# Patient Record
Sex: FEMALE | Race: WHITE | NOT HISPANIC OR LATINO | URBAN - NONMETROPOLITAN AREA
[De-identification: names, ages, dates, MRNs, and addresses within clinical notes are randomized per-mention and may not be internally consistent; named-entity substitution may affect disease eponyms.]

---

## 2018-06-22 PROBLEM — D31.32: Noted: 2018-06-22

## 2018-06-22 PROBLEM — H52.13: Noted: 2018-06-22

## 2018-06-22 PROBLEM — H52.4: Noted: 2018-06-22

## 2019-06-21 ENCOUNTER — IMPORTED ENCOUNTER (OUTPATIENT)
Dept: URBAN - NONMETROPOLITAN AREA CLINIC 1 | Facility: CLINIC | Age: 59
End: 2019-06-21

## 2019-06-21 PROCEDURE — 92014 COMPRE OPH EXAM EST PT 1/>: CPT

## 2019-06-21 PROCEDURE — 92015 DETERMINE REFRACTIVE STATE: CPT

## 2019-06-21 NOTE — PATIENT DISCUSSION
*Choroidal nevus:. OS-  Discussed findings of exam in detail with the patient. -  discussed the risk of choroidal melanoma and the importance of monitoring for this disease. oct today*Myopia:. -  Discussed refractive status with patient in detail. *Presbyopia:. -  Discussed presbyopia with patient in detail. *Gls RX:. -  A new spectacle prescription was created and dispensed today.

## 2020-08-26 ENCOUNTER — IMPORTED ENCOUNTER (OUTPATIENT)
Dept: URBAN - NONMETROPOLITAN AREA CLINIC 1 | Facility: CLINIC | Age: 60
End: 2020-08-26

## 2020-08-26 PROCEDURE — 92014 COMPRE OPH EXAM EST PT 1/>: CPT

## 2020-08-26 PROCEDURE — 92015 DETERMINE REFRACTIVE STATE: CPT

## 2020-08-26 NOTE — PATIENT DISCUSSION
*Choroidal nevus:. OS-  Discussed findings of exam in detail with the patient. -  discussed the risk of choroidal melanoma and the importance of monitoring for this disease. Myopia-Discussed diagnosis with patient. -Explained that people who are myopic are at a higher risk for developing RD/RT and reviewed associated S&S.-Pt to contact our office if symptoms develop. Presbyopia-Discussed diagnosis with patient. Updated spec Rx given. Recommend lens that will provide comfort as well as protect safety and health of eyes.

## 2021-09-01 ENCOUNTER — IMPORTED ENCOUNTER (OUTPATIENT)
Dept: URBAN - NONMETROPOLITAN AREA CLINIC 1 | Facility: CLINIC | Age: 61
End: 2021-09-01

## 2021-09-01 PROCEDURE — 92134 CPTRZ OPH DX IMG PST SGM RTA: CPT

## 2021-09-01 PROCEDURE — 99214 OFFICE O/P EST MOD 30 MIN: CPT

## 2022-04-09 ASSESSMENT — VISUAL ACUITY
OD_SC: 20/30
OS_SC: 20/20
OD_SC: 20/25
OS_SC: 20/20
OD_SC: 20/20
OU_CC: J1+
OS_SC: 20/25
OU_CC: J1+
OS_CC: J1
OD_CC: J1

## 2022-04-09 ASSESSMENT — TONOMETRY
OD_IOP_MMHG: 17
OS_IOP_MMHG: 17
OS_IOP_MMHG: 17
OD_IOP_MMHG: 17
OS_IOP_MMHG: 18
OD_IOP_MMHG: 17

## 2022-09-07 ENCOUNTER — ESTABLISHED PATIENT (OUTPATIENT)
Dept: RURAL CLINIC 1 | Facility: CLINIC | Age: 62
End: 2022-09-07

## 2022-09-07 DIAGNOSIS — H52.4: ICD-10-CM

## 2022-09-07 DIAGNOSIS — D31.32: ICD-10-CM

## 2022-09-07 DIAGNOSIS — H52.13: ICD-10-CM

## 2022-09-07 PROCEDURE — 99214 OFFICE O/P EST MOD 30 MIN: CPT

## 2022-09-07 PROCEDURE — 92134 CPTRZ OPH DX IMG PST SGM RTA: CPT

## 2022-09-07 ASSESSMENT — VISUAL ACUITY
OS_CC: 20/20
OD_CC: 20/20
OU_CC: J1+
OU_CC: 20/20

## 2022-09-07 ASSESSMENT — TONOMETRY
OD_IOP_MMHG: 17
OS_IOP_MMHG: 17

## 2023-09-13 ENCOUNTER — ESTABLISHED PATIENT (OUTPATIENT)
Dept: RURAL CLINIC 1 | Facility: CLINIC | Age: 63
End: 2023-09-13

## 2023-09-13 DIAGNOSIS — H52.4: ICD-10-CM

## 2023-09-13 DIAGNOSIS — D31.32: ICD-10-CM

## 2023-09-13 DIAGNOSIS — H52.13: ICD-10-CM

## 2023-09-13 DIAGNOSIS — H25.9: ICD-10-CM

## 2023-09-13 PROCEDURE — 92134 CPTRZ OPH DX IMG PST SGM RTA: CPT

## 2023-09-13 PROCEDURE — 99214 OFFICE O/P EST MOD 30 MIN: CPT

## 2023-09-13 ASSESSMENT — TONOMETRY
OS_IOP_MMHG: 17
OD_IOP_MMHG: 18

## 2023-09-13 ASSESSMENT — VISUAL ACUITY
OS_CC: 20/20
OU_CC: 20/20
OD_CC: 20/20

## 2024-09-16 ENCOUNTER — COMPREHENSIVE EXAM (OUTPATIENT)
Dept: RURAL CLINIC 1 | Facility: CLINIC | Age: 64
End: 2024-09-16

## 2024-09-16 DIAGNOSIS — D31.32: ICD-10-CM

## 2024-09-16 DIAGNOSIS — H25.9: ICD-10-CM

## 2024-09-16 PROCEDURE — 92014 COMPRE OPH EXAM EST PT 1/>: CPT

## 2024-09-16 PROCEDURE — 92134 CPTRZ OPH DX IMG PST SGM RTA: CPT
